# Patient Record
Sex: FEMALE | Race: WHITE | NOT HISPANIC OR LATINO | URBAN - METROPOLITAN AREA
[De-identification: names, ages, dates, MRNs, and addresses within clinical notes are randomized per-mention and may not be internally consistent; named-entity substitution may affect disease eponyms.]

---

## 2018-01-06 ENCOUNTER — EMERGENCY (EMERGENCY)
Facility: HOSPITAL | Age: 33
LOS: 1 days | Discharge: HOME | End: 2018-01-06
Attending: EMERGENCY MEDICINE | Admitting: EMERGENCY MEDICINE
Payer: SELF-PAY

## 2018-01-06 VITALS
HEIGHT: 62 IN | TEMPERATURE: 98 F | OXYGEN SATURATION: 100 % | SYSTOLIC BLOOD PRESSURE: 120 MMHG | RESPIRATION RATE: 18 BRPM | HEART RATE: 110 BPM | DIASTOLIC BLOOD PRESSURE: 75 MMHG | WEIGHT: 130.07 LBS

## 2018-01-06 DIAGNOSIS — F17.200 NICOTINE DEPENDENCE, UNSPECIFIED, UNCOMPLICATED: ICD-10-CM
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
## 2018-01-06 DIAGNOSIS — F10.120 ALCOHOL ABUSE WITH INTOXICATION, UNCOMPLICATED: ICD-10-CM

## 2018-01-06 DIAGNOSIS — Z79.899 OTHER LONG TERM (CURRENT) DRUG THERAPY: ICD-10-CM

## 2018-01-06 DIAGNOSIS — F41.9 ANXIETY DISORDER, UNSPECIFIED: ICD-10-CM

## 2018-01-06 LAB
ALBUMIN SERPL ELPH-MCNC: 4.1 G/DL — SIGNIFICANT CHANGE UP (ref 3.4–5)
ALP SERPL-CCNC: 41 U/L — SIGNIFICANT CHANGE UP (ref 40–120)
ALT FLD-CCNC: 20 U/L — SIGNIFICANT CHANGE UP (ref 12–42)
ANION GAP SERPL CALC-SCNC: 13 MMOL/L — SIGNIFICANT CHANGE UP (ref 9–16)
AST SERPL-CCNC: 17 U/L — SIGNIFICANT CHANGE UP (ref 15–37)
BILIRUB SERPL-MCNC: 0.1 MG/DL — LOW (ref 0.2–1.2)
BUN SERPL-MCNC: 11 MG/DL — SIGNIFICANT CHANGE UP (ref 7–23)
CALCIUM SERPL-MCNC: 9.1 MG/DL — SIGNIFICANT CHANGE UP (ref 8.5–10.5)
CHLORIDE SERPL-SCNC: 109 MMOL/L — HIGH (ref 96–108)
CO2 SERPL-SCNC: 22 MMOL/L — SIGNIFICANT CHANGE UP (ref 22–31)
CREAT SERPL-MCNC: 0.56 MG/DL — SIGNIFICANT CHANGE UP (ref 0.5–1.3)
ETHANOL SERPL-MCNC: 244 MG/DL — HIGH
GLUCOSE SERPL-MCNC: 110 MG/DL — HIGH (ref 70–99)
HCT VFR BLD CALC: 33.1 % — LOW (ref 34.5–45)
HGB BLD-MCNC: 10.7 G/DL — LOW (ref 11.5–15.5)
MCHC RBC-ENTMCNC: 28.5 PG — SIGNIFICANT CHANGE UP (ref 27–34)
MCHC RBC-ENTMCNC: 32.3 G/DL — SIGNIFICANT CHANGE UP (ref 32–36)
MCV RBC AUTO: 88 FL — SIGNIFICANT CHANGE UP (ref 80–100)
PLATELET # BLD AUTO: 323 K/UL — SIGNIFICANT CHANGE UP (ref 150–400)
POTASSIUM SERPL-MCNC: 3.9 MMOL/L — SIGNIFICANT CHANGE UP (ref 3.5–5.3)
POTASSIUM SERPL-SCNC: 3.9 MMOL/L — SIGNIFICANT CHANGE UP (ref 3.5–5.3)
PROT SERPL-MCNC: 8.2 G/DL — SIGNIFICANT CHANGE UP (ref 6.4–8.2)
RBC # BLD: 3.76 M/UL — LOW (ref 3.8–5.2)
RBC # FLD: 13.9 % — SIGNIFICANT CHANGE UP (ref 10.3–16.9)
SODIUM SERPL-SCNC: 144 MMOL/L — SIGNIFICANT CHANGE UP (ref 132–145)
WBC # BLD: 7.1 K/UL — SIGNIFICANT CHANGE UP (ref 3.8–10.5)
WBC # FLD AUTO: 7.1 K/UL — SIGNIFICANT CHANGE UP (ref 3.8–10.5)

## 2018-01-06 PROCEDURE — 99053 MED SERV 10PM-8AM 24 HR FAC: CPT

## 2018-01-06 PROCEDURE — 99284 EMERGENCY DEPT VISIT MOD MDM: CPT | Mod: 25

## 2018-01-06 RX ORDER — SODIUM CHLORIDE 9 MG/ML
1000 INJECTION INTRAMUSCULAR; INTRAVENOUS; SUBCUTANEOUS ONCE
Qty: 0 | Refills: 0 | Status: COMPLETED | OUTPATIENT
Start: 2018-01-06 | End: 2018-01-06

## 2018-01-06 RX ADMIN — SODIUM CHLORIDE 500 MILLILITER(S): 9 INJECTION INTRAMUSCULAR; INTRAVENOUS; SUBCUTANEOUS at 23:27

## 2018-01-06 NOTE — ED ADULT NURSE NOTE - CHPI ED SYMPTOMS NEG
no decreased eating/drinking/no fever/no pain/no chills/no numbness/no weakness/no nausea/no vomiting/no dizziness/no tingling

## 2018-01-06 NOTE — ED ADULT TRIAGE NOTE - CHIEF COMPLAINT QUOTE
BIBA from [facility] for anxiety. Pt and spouse requesting "heavy sedation" Admits to >4 cocktails PTA. Pt yelling and thrashing in the stretcher, unable to obtain vitals" Hx of similar visits.

## 2018-01-06 NOTE — ED ADULT NURSE NOTE - CHIEF COMPLAINT QUOTE
BIBA from [facility] for anxiety. Pt and spouse requesting "heavy sedation" Admits to >4 cocktails PTA. Pt yelling and thrashing in the stretcher, unable to obtain vitals" Hx of similar visits.

## 2018-01-07 VITALS — HEART RATE: 90 BPM

## 2018-01-07 NOTE — ED PROVIDER NOTE - PROGRESS NOTE DETAILS
received pt in s/o from Dr. Avila at [facility] with sobriety pending. Pt awake, alert, oriented x 3, ambulating with a steady gait unassisted, stable for dc.

## 2018-01-07 NOTE — ED ADULT NURSE REASSESSMENT NOTE - NS ED NURSE REASSESS COMMENT FT1
pt assessed awaiting MD ledezma, found to be resting comfortably in stretcher laughing with significant other, no longer thrashing crying or yelling. Room lights dimmed, door closed and fall precautions in place, will monitor.
pt. aaox3, calm and cooperative. no longer anxious

## 2018-07-28 ENCOUNTER — EMERGENCY (EMERGENCY)
Facility: HOSPITAL | Age: 33
LOS: 1 days | Discharge: HOME | End: 2018-07-28
Admitting: EMERGENCY MEDICINE
Payer: SELF-PAY

## 2018-07-28 VITALS
RESPIRATION RATE: 17 BRPM | TEMPERATURE: 99 F | OXYGEN SATURATION: 98 % | HEART RATE: 86 BPM | DIASTOLIC BLOOD PRESSURE: 75 MMHG | SYSTOLIC BLOOD PRESSURE: 112 MMHG

## 2018-07-28 VITALS
TEMPERATURE: 98 F | OXYGEN SATURATION: 96 % | RESPIRATION RATE: 18 BRPM | SYSTOLIC BLOOD PRESSURE: 138 MMHG | HEART RATE: 94 BPM | DIASTOLIC BLOOD PRESSURE: 92 MMHG

## 2018-07-28 LAB
ALBUMIN SERPL ELPH-MCNC: 3.6 G/DL — SIGNIFICANT CHANGE UP (ref 3.4–5)
ALP SERPL-CCNC: 40 U/L — SIGNIFICANT CHANGE UP (ref 40–120)
ALT FLD-CCNC: 14 U/L — SIGNIFICANT CHANGE UP (ref 12–42)
ANION GAP SERPL CALC-SCNC: 10 MMOL/L — SIGNIFICANT CHANGE UP (ref 9–16)
AST SERPL-CCNC: 20 U/L — SIGNIFICANT CHANGE UP (ref 15–37)
BASOPHILS NFR BLD AUTO: 0.9 % — SIGNIFICANT CHANGE UP (ref 0–2)
BILIRUB SERPL-MCNC: 0.1 MG/DL — LOW (ref 0.2–1.2)
BUN SERPL-MCNC: 10 MG/DL — SIGNIFICANT CHANGE UP (ref 7–23)
CALCIUM SERPL-MCNC: 8.6 MG/DL — SIGNIFICANT CHANGE UP (ref 8.5–10.5)
CHLORIDE SERPL-SCNC: 109 MMOL/L — HIGH (ref 96–108)
CO2 SERPL-SCNC: 23 MMOL/L — SIGNIFICANT CHANGE UP (ref 22–31)
CREAT SERPL-MCNC: 0.62 MG/DL — SIGNIFICANT CHANGE UP (ref 0.5–1.3)
EOSINOPHIL NFR BLD AUTO: 3.6 % — SIGNIFICANT CHANGE UP (ref 0–6)
ETHANOL SERPL-MCNC: 137 MG/DL — HIGH
GLUCOSE SERPL-MCNC: 88 MG/DL — SIGNIFICANT CHANGE UP (ref 70–99)
HCT VFR BLD CALC: 34 % — LOW (ref 34.5–45)
HGB BLD-MCNC: 10.6 G/DL — LOW (ref 11.5–15.5)
IMM GRANULOCYTES NFR BLD AUTO: 0.2 % — SIGNIFICANT CHANGE UP (ref 0–1.5)
LYMPHOCYTES # BLD AUTO: 39.5 % — SIGNIFICANT CHANGE UP (ref 13–44)
MCHC RBC-ENTMCNC: 27.5 PG — SIGNIFICANT CHANGE UP (ref 27–34)
MCHC RBC-ENTMCNC: 31.2 G/DL — LOW (ref 32–36)
MCV RBC AUTO: 88.1 FL — SIGNIFICANT CHANGE UP (ref 80–100)
MONOCYTES NFR BLD AUTO: 7.7 % — SIGNIFICANT CHANGE UP (ref 2–14)
NEUTROPHILS NFR BLD AUTO: 48.1 % — SIGNIFICANT CHANGE UP (ref 43–77)
PLATELET # BLD AUTO: 273 K/UL — SIGNIFICANT CHANGE UP (ref 150–400)
POTASSIUM SERPL-MCNC: 3.9 MMOL/L — SIGNIFICANT CHANGE UP (ref 3.5–5.3)
POTASSIUM SERPL-SCNC: 3.9 MMOL/L — SIGNIFICANT CHANGE UP (ref 3.5–5.3)
PROT SERPL-MCNC: 7.6 G/DL — SIGNIFICANT CHANGE UP (ref 6.4–8.2)
RBC # BLD: 3.86 M/UL — SIGNIFICANT CHANGE UP (ref 3.8–5.2)
RBC # FLD: 14.3 % — SIGNIFICANT CHANGE UP (ref 10.3–16.9)
SODIUM SERPL-SCNC: 142 MMOL/L — SIGNIFICANT CHANGE UP (ref 132–145)
WBC # BLD: 5.8 K/UL — SIGNIFICANT CHANGE UP (ref 3.8–10.5)
WBC # FLD AUTO: 5.8 K/UL — SIGNIFICANT CHANGE UP (ref 3.8–10.5)

## 2018-07-28 PROCEDURE — 99053 MED SERV 10PM-8AM 24 HR FAC: CPT

## 2018-07-28 PROCEDURE — 99284 EMERGENCY DEPT VISIT MOD MDM: CPT | Mod: 25

## 2018-07-28 RX ORDER — SODIUM CHLORIDE 9 MG/ML
1000 INJECTION INTRAMUSCULAR; INTRAVENOUS; SUBCUTANEOUS ONCE
Qty: 0 | Refills: 0 | Status: COMPLETED | OUTPATIENT
Start: 2018-07-28 | End: 2018-07-28

## 2018-07-28 RX ADMIN — Medication 2 MILLIGRAM(S): at 01:30

## 2018-07-28 RX ADMIN — SODIUM CHLORIDE 1000 MILLILITER(S): 9 INJECTION INTRAMUSCULAR; INTRAVENOUS; SUBCUTANEOUS at 05:07

## 2018-07-28 NOTE — ED PROVIDER NOTE - MEDICAL DECISION MAKING DETAILS
Pt A&Ox3. NAD. Reports feeling much better. Calm and cooperative. Clear speech, steady gait with no complaints at this time. Will D/C with instructions to F/U with PMD this week. Strict return precautions reviewed with pt in which pt verbalizes understanding and agrees to.

## 2018-07-28 NOTE — ED PROVIDER NOTE - CONSTITUTIONAL, MLM
normal... Appears intoxicated, well nourished, awake, alert, oriented to person, place, time/situation and in no apparent distress.

## 2018-07-28 NOTE — ED PROVIDER NOTE - OBJECTIVE STATEMENT
32 yo F, pmhx mitral valve prolapse and anxiety not on any meds, presents to ED complaining of a "panic attack" which occurred tonight after she went to dinner and had two vodka drinks. Pt reports that on her way back from dinner she felt anxious, dizzy, and "passed out on the street", which was witnessed by her friend and lasted "a couple seconds"; pt denies hitting her head. Pt says she has experienced syncope associated with her anxiety in the past, and her anxiety is usually worsened by etoh. She reports that in the past she has taken klonopin and xanax for her anxiety but she has not since "last year" because her prescription  and "it is harder to get here. They try to give me anti-depressants." She does not currently have a therapist or psychiatrist, denies drug use, and denies fever, nausea, vomiting, chest pain, SOB, denies SI/HI.

## 2018-07-28 NOTE — ED ADULT TRIAGE NOTE - CHIEF COMPLAINT QUOTE
pt presents with anxiety, states that she normally takes medication for anxiety, however, has not taken it in one day. also admits to etoh.

## 2018-07-28 NOTE — ED ADULT NURSE REASSESSMENT NOTE - NS ED NURSE REASSESS COMMENT FT1
Pt received from [facility], Pt arousable to voice at this time. Pt lab work sent as ordered and fluids running at bedside. Pt exhibiting no s.s of acute distress at this time with friend at bedside. Pt stable, safety maintained. Will continue to monitor.

## 2018-08-01 DIAGNOSIS — F41.9 ANXIETY DISORDER, UNSPECIFIED: ICD-10-CM

## 2018-08-01 DIAGNOSIS — F10.129 ALCOHOL ABUSE WITH INTOXICATION, UNSPECIFIED: ICD-10-CM

## 2020-07-27 NOTE — ED PROVIDER NOTE - EYES, MLM
Addended by: DONALD BEAN on: 7/27/2020 11:31 AM     Modules accepted: Orders     Clear bilaterally, pupils equal, round and reactive to light.

## 2020-10-01 ENCOUNTER — EMERGENCY (EMERGENCY)
Facility: HOSPITAL | Age: 35
LOS: 1 days | Discharge: HOME | End: 2020-10-01
Attending: EMERGENCY MEDICINE | Admitting: EMERGENCY MEDICINE
Payer: MEDICARE

## 2020-10-01 VITALS
SYSTOLIC BLOOD PRESSURE: 137 MMHG | TEMPERATURE: 99 F | HEART RATE: 120 BPM | OXYGEN SATURATION: 99 % | DIASTOLIC BLOOD PRESSURE: 85 MMHG | HEIGHT: 61 IN | RESPIRATION RATE: 26 BRPM

## 2020-10-01 VITALS
TEMPERATURE: 98 F | RESPIRATION RATE: 18 BRPM | HEART RATE: 89 BPM | OXYGEN SATURATION: 99 % | SYSTOLIC BLOOD PRESSURE: 121 MMHG | DIASTOLIC BLOOD PRESSURE: 84 MMHG

## 2020-10-01 DIAGNOSIS — Z20.828 CONTACT WITH AND (SUSPECTED) EXPOSURE TO OTHER VIRAL COMMUNICABLE DISEASES: ICD-10-CM

## 2020-10-01 DIAGNOSIS — F32.9 MAJOR DEPRESSIVE DISORDER, SINGLE EPISODE, UNSPECIFIED: ICD-10-CM

## 2020-10-01 DIAGNOSIS — F43.23 ADJUSTMENT DISORDER WITH MIXED ANXIETY AND DEPRESSED MOOD: ICD-10-CM

## 2020-10-01 DIAGNOSIS — F41.9 ANXIETY DISORDER, UNSPECIFIED: ICD-10-CM

## 2020-10-01 DIAGNOSIS — F41.0 PANIC DISORDER [EPISODIC PAROXYSMAL ANXIETY]: ICD-10-CM

## 2020-10-01 DIAGNOSIS — R45.851 SUICIDAL IDEATIONS: ICD-10-CM

## 2020-10-01 DIAGNOSIS — Z79.899 OTHER LONG TERM (CURRENT) DRUG THERAPY: ICD-10-CM

## 2020-10-01 DIAGNOSIS — F41.1 GENERALIZED ANXIETY DISORDER: ICD-10-CM

## 2020-10-01 DIAGNOSIS — R00.2 PALPITATIONS: ICD-10-CM

## 2020-10-01 PROBLEM — I34.1 NONRHEUMATIC MITRAL (VALVE) PROLAPSE: Chronic | Status: ACTIVE | Noted: 2018-07-28

## 2020-10-01 LAB
ALBUMIN SERPL ELPH-MCNC: 4.8 G/DL — SIGNIFICANT CHANGE UP (ref 3.3–5)
ALP SERPL-CCNC: 39 U/L — LOW (ref 40–120)
ALT FLD-CCNC: 13 U/L — SIGNIFICANT CHANGE UP (ref 10–45)
AMPHET UR-MCNC: NEGATIVE — SIGNIFICANT CHANGE UP
ANION GAP SERPL CALC-SCNC: 18 MMOL/L — HIGH (ref 5–17)
APPEARANCE UR: CLEAR — SIGNIFICANT CHANGE UP
AST SERPL-CCNC: 21 U/L — SIGNIFICANT CHANGE UP (ref 10–40)
BARBITURATES UR SCN-MCNC: NEGATIVE — SIGNIFICANT CHANGE UP
BASOPHILS # BLD AUTO: 0.09 K/UL — SIGNIFICANT CHANGE UP (ref 0–0.2)
BASOPHILS NFR BLD AUTO: 0.8 % — SIGNIFICANT CHANGE UP (ref 0–2)
BENZODIAZ UR-MCNC: NEGATIVE — SIGNIFICANT CHANGE UP
BILIRUB SERPL-MCNC: 0.2 MG/DL — SIGNIFICANT CHANGE UP (ref 0.2–1.2)
BILIRUB UR-MCNC: NEGATIVE — SIGNIFICANT CHANGE UP
BUN SERPL-MCNC: 8 MG/DL — SIGNIFICANT CHANGE UP (ref 7–23)
CALCIUM SERPL-MCNC: 8.9 MG/DL — SIGNIFICANT CHANGE UP (ref 8.4–10.5)
CHLORIDE SERPL-SCNC: 107 MMOL/L — SIGNIFICANT CHANGE UP (ref 96–108)
CO2 SERPL-SCNC: 18 MMOL/L — LOW (ref 22–31)
COCAINE METAB.OTHER UR-MCNC: NEGATIVE — SIGNIFICANT CHANGE UP
COLOR SPEC: YELLOW — SIGNIFICANT CHANGE UP
CREAT SERPL-MCNC: 0.58 MG/DL — SIGNIFICANT CHANGE UP (ref 0.5–1.3)
DIFF PNL FLD: NEGATIVE — SIGNIFICANT CHANGE UP
EOSINOPHIL # BLD AUTO: 0.17 K/UL — SIGNIFICANT CHANGE UP (ref 0–0.5)
EOSINOPHIL NFR BLD AUTO: 1.5 % — SIGNIFICANT CHANGE UP (ref 0–6)
ETHANOL SERPL-MCNC: 206 MG/DL — HIGH (ref 0–10)
GLUCOSE SERPL-MCNC: 95 MG/DL — SIGNIFICANT CHANGE UP (ref 70–99)
GLUCOSE UR QL: NEGATIVE — SIGNIFICANT CHANGE UP
HCG SERPL-ACNC: <0 MIU/ML — SIGNIFICANT CHANGE UP
HCT VFR BLD CALC: 36.9 % — SIGNIFICANT CHANGE UP (ref 34.5–45)
HGB BLD-MCNC: 12.1 G/DL — SIGNIFICANT CHANGE UP (ref 11.5–15.5)
IMM GRANULOCYTES NFR BLD AUTO: 0.2 % — SIGNIFICANT CHANGE UP (ref 0–1.5)
KETONES UR-MCNC: NEGATIVE — SIGNIFICANT CHANGE UP
LEUKOCYTE ESTERASE UR-ACNC: NEGATIVE — SIGNIFICANT CHANGE UP
LYMPHOCYTES # BLD AUTO: 45.4 % — HIGH (ref 13–44)
LYMPHOCYTES # BLD AUTO: 5.03 K/UL — HIGH (ref 1–3.3)
MCHC RBC-ENTMCNC: 29.9 PG — SIGNIFICANT CHANGE UP (ref 27–34)
MCHC RBC-ENTMCNC: 32.8 GM/DL — SIGNIFICANT CHANGE UP (ref 32–36)
MCV RBC AUTO: 91.1 FL — SIGNIFICANT CHANGE UP (ref 80–100)
METHADONE UR-MCNC: NEGATIVE — SIGNIFICANT CHANGE UP
MONOCYTES # BLD AUTO: 0.76 K/UL — SIGNIFICANT CHANGE UP (ref 0–0.9)
MONOCYTES NFR BLD AUTO: 6.9 % — SIGNIFICANT CHANGE UP (ref 2–14)
NEUTROPHILS # BLD AUTO: 5.02 K/UL — SIGNIFICANT CHANGE UP (ref 1.8–7.4)
NEUTROPHILS NFR BLD AUTO: 45.2 % — SIGNIFICANT CHANGE UP (ref 43–77)
NITRITE UR-MCNC: NEGATIVE — SIGNIFICANT CHANGE UP
NRBC # BLD: 0 /100 WBCS — SIGNIFICANT CHANGE UP (ref 0–0)
OPIATES UR-MCNC: NEGATIVE — SIGNIFICANT CHANGE UP
PCP SPEC-MCNC: SIGNIFICANT CHANGE UP
PCP UR-MCNC: NEGATIVE — SIGNIFICANT CHANGE UP
PH UR: 6 — SIGNIFICANT CHANGE UP (ref 5–8)
PLATELET # BLD AUTO: 350 K/UL — SIGNIFICANT CHANGE UP (ref 150–400)
POTASSIUM SERPL-MCNC: 4.4 MMOL/L — SIGNIFICANT CHANGE UP (ref 3.5–5.3)
POTASSIUM SERPL-SCNC: 4.4 MMOL/L — SIGNIFICANT CHANGE UP (ref 3.5–5.3)
PROT SERPL-MCNC: 7.8 G/DL — SIGNIFICANT CHANGE UP (ref 6–8.3)
PROT UR-MCNC: NEGATIVE MG/DL — SIGNIFICANT CHANGE UP
RBC # BLD: 4.05 M/UL — SIGNIFICANT CHANGE UP (ref 3.8–5.2)
RBC # FLD: 12.6 % — SIGNIFICANT CHANGE UP (ref 10.3–14.5)
SARS-COV-2 RNA SPEC QL NAA+PROBE: SIGNIFICANT CHANGE UP
SODIUM SERPL-SCNC: 143 MMOL/L — SIGNIFICANT CHANGE UP (ref 135–145)
SP GR SPEC: <=1.005 — SIGNIFICANT CHANGE UP (ref 1–1.03)
THC UR QL: NEGATIVE — SIGNIFICANT CHANGE UP
UROBILINOGEN FLD QL: 0.2 E.U./DL — SIGNIFICANT CHANGE UP
WBC # BLD: 11.09 K/UL — HIGH (ref 3.8–10.5)
WBC # FLD AUTO: 11.09 K/UL — HIGH (ref 3.8–10.5)

## 2020-10-01 PROCEDURE — 87635 SARS-COV-2 COVID-19 AMP PRB: CPT

## 2020-10-01 PROCEDURE — 99284 EMERGENCY DEPT VISIT MOD MDM: CPT

## 2020-10-01 PROCEDURE — 81003 URINALYSIS AUTO W/O SCOPE: CPT

## 2020-10-01 PROCEDURE — 84702 CHORIONIC GONADOTROPIN TEST: CPT

## 2020-10-01 PROCEDURE — 36415 COLL VENOUS BLD VENIPUNCTURE: CPT

## 2020-10-01 PROCEDURE — 96374 THER/PROPH/DIAG INJ IV PUSH: CPT

## 2020-10-01 PROCEDURE — 80307 DRUG TEST PRSMV CHEM ANLYZR: CPT

## 2020-10-01 PROCEDURE — 93010 ELECTROCARDIOGRAM REPORT: CPT

## 2020-10-01 PROCEDURE — 93005 ELECTROCARDIOGRAM TRACING: CPT

## 2020-10-01 PROCEDURE — 90792 PSYCH DIAG EVAL W/MED SRVCS: CPT | Mod: 95

## 2020-10-01 PROCEDURE — 99053 MED SERV 10PM-8AM 24 HR FAC: CPT

## 2020-10-01 PROCEDURE — 85025 COMPLETE CBC W/AUTO DIFF WBC: CPT

## 2020-10-01 PROCEDURE — 96376 TX/PRO/DX INJ SAME DRUG ADON: CPT

## 2020-10-01 PROCEDURE — 99285 EMERGENCY DEPT VISIT HI MDM: CPT | Mod: 25

## 2020-10-01 PROCEDURE — 80053 COMPREHEN METABOLIC PANEL: CPT

## 2020-10-01 RX ADMIN — Medication 1 MILLIGRAM(S): at 05:31

## 2020-10-01 RX ADMIN — Medication 2 MILLIGRAM(S): at 00:28

## 2020-10-01 NOTE — ED BEHAVIORAL HEALTH ASSESSMENT NOTE - DIFFERENTIAL
Generalized Anxiety  Panic Disorder  Adjustment disorder   Acute stress reaction  Rule out Alcohol use disorder / alcohol related anxiety disorder

## 2020-10-01 NOTE — ED ADULT NURSE REASSESSMENT NOTE - NS ED NURSE REASSESS COMMENT FT1
Pt made SI statements to writer. Provider aware. Pt placed on 1:1 supervision, belongings removed, security @beside. Pt updated on POC.

## 2020-10-01 NOTE — ED BEHAVIORAL HEALTH ASSESSMENT NOTE - DESCRIPTION
As per BTCM note:  PRE-HOSPITAL COURSE  SOURCE:  Chart Review  DETAILS:  Pt was out drinking with a friend when pt. had a panic attack and EMS was activated     ED COURSE   SOURCE:  Chart and RN   ARRIVAL: Ambulance    BELONGINGS:  Nothing of note, allowed security to wand/collect belongings, pt. changed into a gown   BEHAVIOR: Complied with triage protocol and provided blood/urine willingly, pt had a BAL of 206 at 00:33 during the time of intoxication pt did endorse SI, currently pt. is not endorsing SI but is upset, has panic attacks, and reports her anxiety is due to her mothers health concerns, no HI and no delusions, pt's eye contact is fair, speech rate/tone is normal, affect is depressed, thought process is linear and logical, pt. is not aggressive and is AOX4.  TREATMENT: Ativan oral 2mg at 00:28   VISITORS: Pt's friend at bedside   COVID-19: Pt. has not been out of state within the past 14 days and has not been around others exposed to COVID-19 within the past 14 days.       COLLATERAL  NAME: Silvio Snow  NUMBER: [phone]  RELATIONSHIP: Friend   RELIABILITY: Fair  COMMENTS: knows her for many years but does not know details of her history As per HPI MVP

## 2020-10-01 NOTE — ED BEHAVIORAL HEALTH ASSESSMENT NOTE - OTHER PAST PSYCHIATRIC HISTORY (INCLUDE DETAILS REGARDING ONSET, COURSE OF ILLNESS, INPATIENT/OUTPATIENT TREATMENT)
Patient relays a history of panic attacks dating back to 15 or 16 years of age. She was previously in therapy and briefly had outpatient psychiatry. Previously tried on an antidepressant but felt it numbed her and worried medications would change her. She had also had Klonopin and Xanax PRN to rescue from panic attacks and relays that prior to that she would end up in the ED up to 6 times a year. She relays having a good understanding now most times she panics that she is not going to die and that the feeling will pass *** Diagnoses: Patient relays a history of panic attacks dating back to 15 or 16 years of age.   Treaters: She was previously in therapy and briefly had outpatient psychiatry.   Medications: Previously tried on an antidepressant but felt it numbed her and worried medications would change her. She had also had Klonopin and Xanax PRN to rescue from panic attacks.  ED visits: Patient relays that prior to PRN benzos, she would end up in the ED up to 6 times a year. She relays having a good understanding now most times she panics that she is not going to die and that the feeling will pass so most times don't result in ED visit. She relays having a panic attack last Monday.   Admissions: Patient relays once years ago while living in [facility], she had a bad panic attack and took 3 tablets of Xanax rather than one hoping the panic attack would go away quicker. She was hospitalized for a few days for safety assurance but denies this being a suicide attempt or any intent to harm herself.   Safety: no prior suicide attempts or non-suicidal self injury and no hx of violence

## 2020-10-01 NOTE — ED BEHAVIORAL HEALTH ASSESSMENT NOTE - HPI (INCLUDE ILLNESS QUALITY, SEVERITY, DURATION, TIMING, CONTEXT, MODIFYING FACTORS, ASSOCIATED SIGNS AND SYMPTOMS)
This is a 35 year old single, employed female, non-caregiver, domiciled alone, with past psychiatric history of Panic disorder, one prior psychiatric admission in [place], no prior suicide attempts or non-suicidal self injury, no known substance use history though pt did present intoxicated and has been intoxicated on prior sunrise ED visits in 2018, no known legal history or history of violence, and past medical history of mitral valve prolapse who presented to the ED via EMS activated by a friend due to an anxiety attack. She was reportedly out with this friend having drinks, talking about family problems and began having a panic attack. She initially relayed on arrival stress related to her mom being ill and thoughts of suicide if her mom were to die. BAL was initially 206 at 0:33. On re-evaluation at 4:45, she is no longer relaying SI but rather relays sad thoughts.     On psychiatric evaluation, patient relays "the main issue that bothers me lately is my mom who has cancer." She describes that her mom was diagnosed with a blood/platelet cancer about 2 years ago, is undergoing chemotherapy and seemed to be fairly stable but suddenly deteriorated in the last few weeks, couldn't eat, got really weak and ended up hospitalized and unresponsive. Patient relays that mom has since recovered acutely and is back home but is still weaker than she used to be and the incident has made it such that she worries more about her mom dying. She relays mom had already had breast cancer 5 to 6 years ago, treated and in remission before this new cancer diagnosis. Patient relays the stress of her mom's current condition involved the fact that mom lives in [place] and patient hasn't been able to see her during the pandemic. Even now, she is still unable to as her passport is  and she remains uncertain about what to do with her job and apartment if she goes to [place]; that she may have to stay for 1, 2 maybe 3 months. She worries because her father is a recovering alcoholic and fears he will drink himself to death if mom passes so then she would have lost 2 parents.     Patient reports that she has felt "sad" throughout this period and that the situation is "devastating." She relays simply not knowing how to handle it. She relays sleeping excessively, up to 14 hours a day, to avoid thinking about it, reduced appetite of once a day, and mostly spends most of her time (when not sleeping) working which serves as a good distraction from thinking about the situation. She relays excessive worrying if not distracted, feeling on edge, and difficulty maneuvering all the decisions she needs to be making at this time stating "I can't bring myself to make those decisions." She relays good energy and has been trying yoga at home to relax and says "it actually helps." She relays that she has had thoughts of sleeping and not waking up and notes that earlier she said she would kill herself if her mom dies "but I don't really want to hurt myself, I want to live especially now." She relays that her sister, also in [place], has a 1 and half year old daughter whom mom used to help with when she was more active and "they need my support." She relays that "its just very hard to accept," that she's never dealt with a situation like this and doesn't know how to cope. As she relays feeling overwhelmed, she begins having a panic attack and spontaneously relays during the panic attack how she feels helpless and this is why she said she'd just kill herself if her mom .     Collateral from patient's friend at bedside is as per [facility] note with relevant details as such:   BASELINE FUNCTIONING: Per pt's friends, pt. lives on her own and takes care of her ADL's. Pt. enjoys her full-time job and rarely calls out. Pt. has normal appetite and sleep patterns. Pt. has never been psychiatrically admitted and has no mental health services in place and is not on any medications. Pt. has no hx of SI but has a hx of anxiety and panic attacks.   DATE HPI STARTED: Prior to tonight   DECOMPENSATION: Per collateral, pt's friend for years, they were out together tonight going to various bars and drinking when the pt. began to talk about how sick her mother is and had a bad panic attack. Pt was hyperventilating and crying and he called 911. Pt's mother is in another country and not doing well with a chance of passing soon. Per collateral pt. has no other symptoms, no SI, collateral does not believe the pt. is a danger to self or others and would like to see her discharged.    SUICIDALITY: Pertinent no  VIOLENCE:  Pertinent no   SUBSTANCE: Reported none. Although pt. tonight and in prior visits per chart review has been intoxicated.

## 2020-10-01 NOTE — ED BEHAVIORAL HEALTH ASSESSMENT NOTE - ACTIVATING EVENTS/STRESSORS
Triggering events leading to humiliation, shame, and/or despair (e.g. Loss of relationship, financial or health status) (real or anticipated)/Non-compliant or not receiving treatment/Substance intoxication or withdrawal

## 2020-10-01 NOTE — ED PROVIDER NOTE - TEMPLATE, MLM
Pt was given negative biopsy results by phone call on 1/11/19 per Jaye mcguire.   Abdominal Pain, N/V/D

## 2020-10-01 NOTE — ED PROVIDER NOTE - PATIENT PORTAL LINK FT
You can access the FollowMyHealth Patient Portal offered by [facility] by registering at the following website: [email or web address]. By joining [employer]’s FollowMyHealth portal, you will also be able to view your health information using other applications (apps) compatible with our system.

## 2020-10-01 NOTE — ED BEHAVIORAL HEALTH ASSESSMENT NOTE - REFERRAL / APPOINTMENT DETAILS
Social work evaluation when patient is calm to facilitate outpatient psychiatric and psychotherapy resources

## 2020-10-01 NOTE — ED BEHAVIORAL HEALTH ASSESSMENT NOTE - DETAILS
as per HPI mother with cancer father with alcohol use CP with panic dyspnea and hyperventilation with panic shakiness & dizziness when panicking. Also feels right arm and leg weakness for a few days after a major panic attack friend/self referred apathy with an antidepressant previously tried as below see SP

## 2020-10-01 NOTE — ED BEHAVIORAL HEALTH NOTE - BEHAVIORAL HEALTH NOTE
===================  PRE-HOSPITAL COURSE  ===================  SOURCE:  Chart Review  DETAILS:  Pt was out drinking with a friend when pt. had a panic attack and EMS was activated     ============  ED COURSE   ============  SOURCE:  Chart and RN   ARRIVAL: Ambulance    BELONGINGS:  Nothing of note, allowed security to wand/collect belongings, pt. changed into a gown   BEHAVIOR: Complied with triage protocol and provided blood/urine willingly, pt had a BAL of 206 at 00:33 during the time of intoxication pt did endorse SI, currently pt. is not endorsing SI but is upset, has panic attacks, and reports her anxiety is due to her mothers health concerns, no HI and no delusions, pt's eye contact is fair, speech rate/tone is normal, affect is depressed, thought process is linear and logical, pt. is not aggressive and is AOX4.  TREATMENT: Ativan oral 2mg at 00:28   VISITORS: Pt's friend at bedside   COVID-19: Pt. has not been out of state within the past 14 days and has not been around others exposed to COVID-19 within the past 14 days.       ========================  COLLATERAL  ========================  NAME: Silvio Snow  NUMBER: [phone]  RELATIONSHIP: Friend   RELIABILITY: Fair  COMMENTS: knows her for many years but does not know details of her history     ========================  PATIENT DEMOGRAPHICS: 35, female, single, non-caregiver, domiciled alone, fulltime employer in [place]  ========================  HPI  BASELINE FUNCTIONING: Per pt's friends, pt. lives on her own and takes care of her ADL's. Pt. enjoys her full-time job and rarely calls out. Pt. has normal appetite and sleep patterns. Pt. has never been psychiatrically admitted and has no mental health services in place and is not on any medications. Pt. has no hx of SI but has a hx of anxiety and panic attacks.   DATE HPI STARTED: Prior to tonight   DECOMPENSATION: Per collateral, pt's friend for years, they were out together tonight going to various bars and drinking when the pt. began to talk about how sick her mother is and had a bad panic attack. Pt was hyperventilating and crying and he called 911. Pt's mother is in another county and not doing well with a chance of passing soon. Per collateral pt. has no other symptoms, no SI, collateral does not believe the pt. is a danger to self or others and would like to see her discharged.    SUICIDALITY: Pertinent no  VIOLENCE:  Pertinent no   SUBSTANCE: Reported none. Although pt. tonight and in prior visits per chart review has been intoxicated.         ========================  PAST PSYCHIATRIC HISTORY; No psych hx  ========================    ==============  OTHER HISTORY  ==============  CURRENT MEDICATION:  No meds   LEGAL ISSUES: None   FIREARM ACCESS: none

## 2020-10-01 NOTE — ED ADULT NURSE REASSESSMENT NOTE - NS ED NURSE REASSESS COMMENT FT1
Assumed Pt from [facility]. Pt resting comfortably awaiting social work. Pain Refusal Text: I offered to prescribe pain medication but the patient refused to take this medication.

## 2020-10-01 NOTE — ED ADULT NURSE NOTE - OBJECTIVE STATEMENT
Pt arrives to ED for panic attack. Pt hyperventilating, unable to speak/answer questions. Pt has episodes of full body shaking, pt remains awake and alert during episodes. Pt friend reports pt has hx of anxiety and panic attacks, not currently on any medications. Pt able to follow instructions at this time. Pt received Ativan (see MAR), and symptoms improved. Pt reports panic attack was triggered by stress over mother's health. Pt states "I cant live without my mother." Pt reports to having suicidal thoughts if mother passes. Pt reports she is stressed and unable to cope independently, asking to see a psychiatrist. Pt placed on 1:1 supervision d/t SI.

## 2020-10-01 NOTE — ED BEHAVIORAL HEALTH ASSESSMENT NOTE - SUMMARY
This is a 35 year old single, employed female, non-caregiver, domiciled alone, with past psychiatric history of Panic disorder, one prior psychiatric admission in [place], no prior suicide attempts or non-suicidal self injury, no known substance use history though pt did present intoxicated and has been intoxicated on prior [facility] ED visits in 2018, no known legal history or history of violence, and past medical history of mitral valve prolapse who presented to the ED via EMS activated by a friend due to an anxiety attack. She was reportedly out with this friend having drinks, talking about family problems and began having a panic attack. She initially relayed on arrival stress related to her mom being ill and thoughts of suicide if her mom were to die. BAL was initially 206 at 0:33. On re-evaluation at 4:45, she is no longer relaying SI but rather relays sad thoughts.     On assessment, she relays prominent symptoms of generalized anxiety and panic attacks that are acutely exacerbated in the context of mother's worsening health and associated stressors (cancer dx, recent hospitalization for acute decompensation but now back home, mother is in [place], pt passport , hasn't been able to vist thus far, unsure whether to let apartment lease  and go to [place] indefinitely, not sure what to do about her job, etc). There is also sad mood, reduced appetite and sleeping excessively to avoid ruminating on this stressor. She is observed during an active panic attack and expresses helplessness and thoughts to suicide if mother dies without apparent plan or genuine intent.  Her assessment is most consistent with an adjustment disorder due to a stressor that is also exacerbating her known anxiety disorder. When at baseline, she is notably devoid of active SI, intent or plan and spontaneously relays ample protective factors including future orientation towards addressing this stressor.     Patient does not meet criteria for involuntary level of psychiatric care and spontaneously relays how she has been thinking it would be a good idea to get on an antidepressant medication and start seeing a therapist again. At this time, acute management should be focused on mitigating panic attack evidenced at conclusion of the assessment with lorazepam, which she previously responded to. When she is calm and alert once more, recommend social work to engage patient in resource options and referrals. It would also be appropriate to provide a limited supply of lorazepam for rescue during panic attacks as a bridge towards her outpatient referral.

## 2020-10-01 NOTE — ED BEHAVIORAL HEALTH ASSESSMENT NOTE - OTHER
extremities shaking intermittently with anxiety Notably engages appropriately when discussing active stressors and symptoms but later has a panic attack and appears scattered, overwhelmed Unable to assess extremities shaking intermittently during panic attack. No involuntary movements at rest otherwise Prominent symptoms of generalized anxiety, panic and limited sx of depression in the context of a significant stressor; Notable for expressions of helplessness and suicidal statement without plan/intent limited to when actively panicking Intact/normal throughout assessment; scattered during panic attack serious illness of parent [facility], [facility], [facility], [facility], [facility], [facility], [facility], [facility], [facility], [facility], [facility], [facility], [facility], [facility], [facility], [facility], [facility], [facility], [facility], [facility], [facility], Social Media (For example - Facebook, [employer], Yelp), Web search, [employer] alcohol intoxicated on presentation

## 2020-10-01 NOTE — ED PROVIDER NOTE - OBJECTIVE STATEMENT
35F hx anxiety, BIBEMS for anxiety.  per EMS pt was with a friend having some drinks and was talking about her family problems.  states then started having panic attack.  states felt like couldn't breath, heart racing.  pt states has had panic attacks in the past.  not currently on any medication, does not currently have a psychiatrist/psychologist.  pt states her mom is currently having health problems. states she has been feeling depressed and having suicidal thoughts due to her mom being ill.

## 2020-10-01 NOTE — ED ADULT TRIAGE NOTE - OTHER COMPLAINTS
was out with a friend and was talking about family problems when patient started to have a panic attack, has hx of panic attacks in the past, not on medications

## 2020-10-01 NOTE — ED BEHAVIORAL HEALTH ASSESSMENT NOTE - DESCRIPTION (FIRST USE, LAST USE, QUANTITY, FREQUENCY, DURATION)
No formal history of alcohol use but notably for intermittent consumption (pt relays up to 4 drinks) socially and prior ED visits with elevated BAL ( in 2018)

## 2020-10-01 NOTE — ED BEHAVIORAL HEALTH ASSESSMENT NOTE - MEDICATIONS (PRESCRIPTIONS, DIRECTIONS)
Ativan 1 mg IV now for panic attack. Consider providing prescription for Ativan 1 mg PRN severe panic (no more than 7 tablets) at time of discharge

## 2020-10-01 NOTE — ED BEHAVIORAL HEALTH ASSESSMENT NOTE - RISK ASSESSMENT
Risks: mood sx, severe anxiety/panic, triggering stressor that is ongoing, not receiving treatment, alcohol intoxicated on presentation.   Protective factors: expresses duty to other loved ones if mother passes, has future plans, identifies reasons for living (even if mother passes), supportive social network, engaged in work Low Acute Suicide Risk

## 2020-10-01 NOTE — ED BEHAVIORAL HEALTH ASSESSMENT NOTE - SUICIDE PROTECTIVE FACTORS
Responsibility to family and others/Identifies reasons for living/Has future plans/Supportive social network of family or friends/Engaged in work or school

## 2020-10-01 NOTE — ED PROVIDER NOTE - NSFOLLOWUPINSTRUCTIONS_ED_ALL_ED_FT
Anxiety    WHAT YOU NEED TO KNOW:    Anxiety is a condition that causes you to feel extremely worried or nervous. The feelings are so strong that they can cause problems with your daily activities or sleep. Anxiety may be triggered by something you fear, or it may happen without a cause. Family or work stress, smoking, caffeine, and alcohol can increase your risk for anxiety. Certain medicines or health conditions can also increase your risk. Anxiety can become a long-term condition if it is not managed or treated.    DISCHARGE INSTRUCTIONS:    Call your local emergency number (911 in the [place]) if:   •You have chest pain, tightness, or heaviness that may spread to your shoulders, arms, jaw, neck, or back.      •You feel like hurting yourself or someone else.      Call your doctor if:   •Your symptoms get worse or do not get better with treatment.      •Your anxiety keeps you from doing your regular daily activities.      •You have new symptoms since your last visit.      •You have questions or concerns about your condition or care.      Medicines:   •Medicines may be given to help you feel more calm and relaxed, and decrease your symptoms.      •Take your medicine as directed. Contact your healthcare provider if you think your medicine is not helping or if you have side effects. Tell him of her if you are allergic to any medicine. Keep a list of the medicines, vitamins, and herbs you take. Include the amounts, and when and why you take them. Bring the list or the pill bottles to follow-up visits. Carry your medicine list with you in case of an emergency.      Manage anxiety:   •Talk to someone about your anxiety. Your healthcare provider may suggest counseling. Cognitive behavioral therapy can help you understand and change how you react to events that trigger your symptoms. You might feel more comfortable talking with a friend or family member about your anxiety. Choose someone you know will be supportive and encouraging.      •Find ways to relax. Activities such as exercise, meditation, or listening to music can help you relax. Spend time with friends, or do things you enjoy.      •Practice deep breathing. Deep breathing can help you relax when you feel anxious. Focus on taking slow, deep breaths several times a day, or during an anxiety attack. Breathe in through your nose and out through your mouth.      •Create a regular sleep routine. Regular sleep can help you feel calmer during the day. Go to sleep and wake up at the same times every day. Do not watch television or use the computer right before bed. Your room should be comfortable, dark, and quiet.      •Eat a variety of healthy foods. Healthy foods include fruits, vegetables, low-fat dairy products, lean meats, fish, whole-grain breads, and cooked beans. Healthy foods can help you feel less anxious and have more energy.  Healthy Foods           •Exercise regularly. Exercise can increase your energy level. Exercise may also lift your mood and help you sleep better. Your healthcare provider can help you create an exercise plan.  Walking for Exercise           •Do not smoke. Nicotine and other chemicals in cigarettes and cigars can increase anxiety. Ask your healthcare provider for information if you currently smoke and need help to quit. E-cigarettes or smokeless tobacco still contain nicotine. Talk to your healthcare provider before you use these products.      •Do not have caffeine. Caffeine can make your symptoms worse. Do not have foods or drinks that are meant to increase your energy level.      •Limit or do not drink alcohol. Ask your healthcare provider if alcohol is safe for you. You may not be able to drink alcohol if you take certain anxiety or depression medicines. Limit alcohol to 1 drink per day if you are a woman. Limit alcohol to 2 drinks per day if you are a man. A drink of alcohol is 12 ounces of beer, 5 ounces of wine, or 1½ ounces of liquor.      •Do not use drugs. Drugs can make your anxiety worse. It can also make anxiety hard to manage. Talk to your healthcare provider if you use drugs and want help to quit.      Follow up with your doctor within 2 weeks or as directed: Write down your questions so you remember to ask them during your visits.

## 2020-10-01 NOTE — ED PROVIDER NOTE - PROGRESS NOTE DETAILS
pt feeling much better after ativan. HR improved. pt evaluated by psych - pt can be followed as outpt.  pt developed another panic attack. psych recommend another dose of ativan. pt may benefit from po ativan.  recommend her to be seen by SW pt feeling well, wants to go home. SW will call and f/u with the patient

## 2020-10-01 NOTE — ED PROVIDER NOTE - CLINICAL SUMMARY MEDICAL DECISION MAKING FREE TEXT BOX
anxiety, hyperventilating, thrashing around in stretcher, tachycardic. aox3, admits to suicidal thoughts due to her mom being ill. states has had similar panic attacks in the past  -check labs, ekg  -ativan  1:1  -psych consult

## 2020-12-22 NOTE — ED ADULT NURSE NOTE - NS_SISCREENINGSR_GEN_ALL_ED
Mauc Instructions: By selecting yes to the question below the MAUC number will be added into the note.  This will be calculated automatically based on the diagnosis chosen, the size entered, the body zone selected (H,M,L) and the specific indications you chose. You will also have the option to override the Mohs AUC if you disagree with the automatically calculated number and this option is found in the Case Summary tab. Positive

## 2022-04-26 NOTE — ED ADULT NURSE NOTE - NS_BHTRGCALCULATEDSCORE_ED_A_ED_FT
5

## 2022-05-04 NOTE — ED BEHAVIORAL HEALTH ASSESSMENT NOTE - CURRENT PLAN:
Caller: Megan Post    Relationship to patient: Self    Best call back number: [phone]    Chief complaint: MEDICATION REFILLS DISCUSS HORMONES     Type of visit: NEW PATIENT     Requested date: AS SOON AS POSSIBLE         Additional notes:THE PATIENT WOULD LIKE TO BE SEEN FOR HER MEDICATION REFILL THE PATIENT WAS A PATIENT OF [facility] THE PATIENT WOULD LIKE TO ESTABLISH CARE UNDER ANOTHER FEMALE DOCTOR AT THE PRACTICE THE PATIENT WOULD LIKE TO ESTABLISH CARE UNDER EMILY ESCOBEDO IF POSSIBLE THE PATIENT STATES THAT SHE ONLY HAS ABOUT 5 PILLS LEFT OF HER ESTRADIOL MEDICATION PLEASE CALL THE PATIENT TO LET HER KNOW IF SHE CAN BE RESTABLISHED UNDER ONE OF THE OTHER FEMALE DOCTORS AT THE OFFICE              None known

## 2022-05-13 NOTE — ED ADULT NURSE NOTE - NSIMPLEMENTINTERV_GEN_ALL_ED
98.7
Implemented All Universal Safety Interventions:  [place] to call system. Call bell, personal items and telephone within reach. Instruct patient to call for assistance. Room bathroom lighting operational. Non-slip footwear when patient is off stretcher. Physically safe environment: no spills, clutter or unnecessary equipment. Stretcher in lowest position, wheels locked, appropriate side rails in place.

## 2023-12-28 NOTE — ED PROVIDER NOTE - CARE PLAN
Please follow-up with your primary care physician for further checkup and recommendations if the abdominal pain does not improve
Principal Discharge DX:	Anxiety

## 2024-01-16 NOTE — ED ADULT NURSE NOTE - PRO INTERPRETER NEED 2
Anesthesia Post-op Note    Patient: Susie Cantu  Procedure(s) Performed: EXTRACTION, CATARACT, WITH IOL INSERTION/RIGHT EYE (Right: Eye)  Anesthesia type: MAC    Vitals Value Taken Time   Temp 36.1 °C (97 °F) 01/16/24 0913   Pulse 64 01/16/24 0918   Resp 16 01/16/24 0918   SpO2 99 % 01/16/24 0918   /65 01/16/24 0918         Patient Location: PACU Phase 2  Post-op Vital Signs:stable  Level of Consciousness: participates in exam, awake, oriented, answers questions appropriately and alert  Respiratory Status: spontaneous ventilation and room air  Cardiovascular blood pressure returned to baseline  Hydration: euvolemic  Pain Management: well controlled  Handoff: Handoff to receiving nurse was performed and questions were answered  Nausea: None  Airway Patency:patent  Post-op Assessment: awake, alert, appropriately conversant, or baseline, no complications and patient tolerated procedure well  Comments: VSS.      No notable events documented.                      
English